# Patient Record
Sex: FEMALE | Race: WHITE | URBAN - METROPOLITAN AREA
[De-identification: names, ages, dates, MRNs, and addresses within clinical notes are randomized per-mention and may not be internally consistent; named-entity substitution may affect disease eponyms.]

---

## 2018-03-30 ENCOUNTER — INPATIENT (INPATIENT)
Facility: HOSPITAL | Age: 48
LOS: 1 days | Discharge: HOME | DRG: 206 | End: 2018-04-01
Attending: INTERNAL MEDICINE | Admitting: INTERNAL MEDICINE
Payer: MEDICAID

## 2018-03-30 VITALS
DIASTOLIC BLOOD PRESSURE: 73 MMHG | RESPIRATION RATE: 16 BRPM | TEMPERATURE: 99 F | SYSTOLIC BLOOD PRESSURE: 110 MMHG | OXYGEN SATURATION: 96 % | HEART RATE: 74 BPM

## 2018-03-30 DIAGNOSIS — R07.9 CHEST PAIN, UNSPECIFIED: ICD-10-CM

## 2018-03-30 PROBLEM — Z00.00 ENCOUNTER FOR PREVENTIVE HEALTH EXAMINATION: Status: ACTIVE | Noted: 2018-03-30

## 2018-03-30 LAB
ALBUMIN SERPL ELPH-MCNC: 3.2 G/DL — LOW (ref 3.5–5)
ALP SERPL-CCNC: 74 U/L — SIGNIFICANT CHANGE UP (ref 40–120)
ALT FLD-CCNC: 16 U/L DA — SIGNIFICANT CHANGE UP (ref 10–60)
ANION GAP SERPL CALC-SCNC: 9 MMOL/L — SIGNIFICANT CHANGE UP (ref 5–17)
AST SERPL-CCNC: 14 U/L — SIGNIFICANT CHANGE UP (ref 10–40)
BASOPHILS # BLD AUTO: 0.1 K/UL — SIGNIFICANT CHANGE UP (ref 0–0.2)
BASOPHILS NFR BLD AUTO: 0.7 % — SIGNIFICANT CHANGE UP (ref 0–2)
BILIRUB SERPL-MCNC: 0.5 MG/DL — SIGNIFICANT CHANGE UP (ref 0.2–1.2)
BUN SERPL-MCNC: 7 MG/DL — SIGNIFICANT CHANGE UP (ref 7–18)
CALCIUM SERPL-MCNC: 8.9 MG/DL — SIGNIFICANT CHANGE UP (ref 8.4–10.5)
CHLORIDE SERPL-SCNC: 107 MMOL/L — SIGNIFICANT CHANGE UP (ref 96–108)
CO2 SERPL-SCNC: 25 MMOL/L — SIGNIFICANT CHANGE UP (ref 22–31)
CREAT SERPL-MCNC: 0.57 MG/DL — SIGNIFICANT CHANGE UP (ref 0.5–1.3)
EOSINOPHIL # BLD AUTO: 0.2 K/UL — SIGNIFICANT CHANGE UP (ref 0–0.5)
EOSINOPHIL NFR BLD AUTO: 3.1 % — SIGNIFICANT CHANGE UP (ref 0–6)
GLUCOSE SERPL-MCNC: 93 MG/DL — SIGNIFICANT CHANGE UP (ref 70–99)
HCT VFR BLD CALC: 40.8 % — SIGNIFICANT CHANGE UP (ref 34.5–45)
HGB BLD-MCNC: 12.6 G/DL — SIGNIFICANT CHANGE UP (ref 11.5–15.5)
LYMPHOCYTES # BLD AUTO: 2.5 K/UL — SIGNIFICANT CHANGE UP (ref 1–3.3)
LYMPHOCYTES # BLD AUTO: 30.4 % — SIGNIFICANT CHANGE UP (ref 13–44)
MCHC RBC-ENTMCNC: 28 PG — SIGNIFICANT CHANGE UP (ref 27–34)
MCHC RBC-ENTMCNC: 31 GM/DL — LOW (ref 32–36)
MCV RBC AUTO: 90.4 FL — SIGNIFICANT CHANGE UP (ref 80–100)
MONOCYTES # BLD AUTO: 0.4 K/UL — SIGNIFICANT CHANGE UP (ref 0–0.9)
MONOCYTES NFR BLD AUTO: 4.5 % — SIGNIFICANT CHANGE UP (ref 2–14)
NEUTROPHILS # BLD AUTO: 5 K/UL — SIGNIFICANT CHANGE UP (ref 1.8–7.4)
NEUTROPHILS NFR BLD AUTO: 61.4 % — SIGNIFICANT CHANGE UP (ref 43–77)
PLATELET # BLD AUTO: 254 K/UL — SIGNIFICANT CHANGE UP (ref 150–400)
POTASSIUM SERPL-MCNC: 4.2 MMOL/L — SIGNIFICANT CHANGE UP (ref 3.5–5.3)
POTASSIUM SERPL-SCNC: 4.2 MMOL/L — SIGNIFICANT CHANGE UP (ref 3.5–5.3)
PROT SERPL-MCNC: 6.9 G/DL — SIGNIFICANT CHANGE UP (ref 6–8.3)
RBC # BLD: 4.52 M/UL — SIGNIFICANT CHANGE UP (ref 3.8–5.2)
RBC # FLD: 16.1 % — HIGH (ref 10.3–14.5)
SODIUM SERPL-SCNC: 141 MMOL/L — SIGNIFICANT CHANGE UP (ref 135–145)
TROPONIN I SERPL-MCNC: <0.015 NG/ML — SIGNIFICANT CHANGE UP (ref 0–0.04)
TROPONIN I SERPL-MCNC: <0.015 NG/ML — SIGNIFICANT CHANGE UP (ref 0–0.04)
WBC # BLD: 8.2 K/UL — SIGNIFICANT CHANGE UP (ref 3.8–10.5)
WBC # FLD AUTO: 8.2 K/UL — SIGNIFICANT CHANGE UP (ref 3.8–10.5)

## 2018-03-30 PROCEDURE — 70450 CT HEAD/BRAIN W/O DYE: CPT | Mod: 26

## 2018-03-30 PROCEDURE — 99285 EMERGENCY DEPT VISIT HI MDM: CPT

## 2018-03-30 PROCEDURE — 71045 X-RAY EXAM CHEST 1 VIEW: CPT | Mod: 26

## 2018-03-30 RX ORDER — ATORVASTATIN CALCIUM 80 MG/1
40 TABLET, FILM COATED ORAL AT BEDTIME
Qty: 0 | Refills: 0 | Status: DISCONTINUED | OUTPATIENT
Start: 2018-03-30 | End: 2018-04-01

## 2018-03-30 RX ORDER — ACETAMINOPHEN 500 MG
650 TABLET ORAL EVERY 6 HOURS
Qty: 0 | Refills: 0 | Status: DISCONTINUED | OUTPATIENT
Start: 2018-03-30 | End: 2018-04-01

## 2018-03-30 RX ORDER — ASPIRIN/CALCIUM CARB/MAGNESIUM 324 MG
81 TABLET ORAL DAILY
Qty: 0 | Refills: 0 | Status: DISCONTINUED | OUTPATIENT
Start: 2018-03-31 | End: 2018-04-01

## 2018-03-30 RX ORDER — ASPIRIN/CALCIUM CARB/MAGNESIUM 324 MG
325 TABLET ORAL ONCE
Qty: 0 | Refills: 0 | Status: COMPLETED | OUTPATIENT
Start: 2018-03-30 | End: 2018-03-30

## 2018-03-30 RX ORDER — NICOTINE POLACRILEX 2 MG
1 GUM BUCCAL DAILY
Qty: 0 | Refills: 0 | Status: DISCONTINUED | OUTPATIENT
Start: 2018-03-30 | End: 2018-03-31

## 2018-03-30 RX ADMIN — Medication 325 MILLIGRAM(S): at 22:08

## 2018-03-30 NOTE — H&P ADULT - NSHPLABSRESULTS_GEN_ALL_CORE
LABS:                        12.6   8.2   )-----------( 254      ( 30 Mar 2018 15:21 )             40.8     03-30    141  |  107  |  7   ----------------------------<  93  4.2   |  25  |  0.57    Ca    8.9      30 Mar 2018 15:16    TPro  6.9  /  Alb  3.2<L>  /  TBili  0.5  /  DBili  x   /  AST  14  /  ALT  16  /  AlkPhos  74  03-30        CAPILLARY BLOOD GLUCOSE        LIVER FUNCTIONS - ( 30 Mar 2018 15:16 )  Alb: 3.2 g/dL / Pro: 6.9 g/dL / ALK PHOS: 74 U/L / ALT: 16 U/L DA / AST: 14 U/L / GGT: x             CARDIAC MARKERS ( 30 Mar 2018 19:45 )  <0.015 ng/mL / x     / x     / x     / x      CARDIAC MARKERS ( 30 Mar 2018 15:16 )  <0.015 ng/mL / x     / x     / x     / x    < from: CT Head No Cont (03.30.18 @ 23:24) >    FINDINGS:    There is no acute intracranial hemorrhage, mass effect from vasogenic   edema or evidence of acute territorial infarct.    The ventricles, sulci and cisternal spaces are unremarkable in size and   configuration.  There is no midline shift or abnormal extra-axial fluid   collection.    The calvarium is intact.  There are no osteoblastic or lytic calvarial or   skull base lesions.  The paranasal sinuses and mastoid air cells are   clear.    IMPRESSION:  No acute intracranial hemorrhage, mass effect or evidence of acute   territorial infarct.    < end of copied text >    < from: Xray Chest 1 View AP/PA (03.30.18 @ 14:36) >      Heart size is within normal limits.    The present study shows minimal linear densities at the left lower lung   field laterally suggesting slight atelectasis or scar. These are new   since October 28, 2011.    IMPRESSION: Minimal linear left lower lung findings are now seen.    < end of copied text >

## 2018-03-30 NOTE — H&P ADULT - NSHPPHYSICALEXAM_GEN_ALL_CORE
PHYSICAL EXAM:  GENERAL: NAD, well-groomed, well-developed  HEAD:  Atraumatic, Normocephalic  EYES: EOMI, PERRLA, conjunctiva and sclera clear  ENMT: No tonsillar erythema, exudates, or enlargement; Moist mucous membranes, Good dentition, No lesions  NECK: Supple, No JVD, Normal thyroid  NERVOUS SYSTEM:  Alert & Oriented X3, Good concentration; Motor Strength 5/5 B/L upper and lower extremities; mild left hand grab strength compared to right side  CHEST/LUNG: Clear to percussion bilaterally; No rales, rhonchi, wheezing, or rubs  HEART: Regular rate and rhythm; No murmurs, rubs, or gallops  ABDOMEN: Soft, Nontender, Nondistended; Bowel sounds present  EXTREMITIES:  2+ Peripheral Pulses bilaterally, No clubbing, cyanosis, or edema  LYMPH: No lymphadenopathy noted  SKIN: No rashes or lesions    T(C): 36.7 (03-30-18 @ 20:49), Max: 37.3 (03-30-18 @ 13:58)  HR: 50 (03-30-18 @ 20:49) (50 - 74)  BP: 99/52 (03-30-18 @ 20:49) (99/52 - 110/73)  RR: 18 (03-30-18 @ 20:49) (16 - 18)  SpO2: 98% (03-30-18 @ 20:49) (96% - 99%)  Wt(kg): --  I&O's Summary

## 2018-03-30 NOTE — H&P ADULT - PROBLEM SELECTOR PLAN 2
-r/o CVA, given risk factors(smoking, HLD). Will check TTE and MR head  -CT head negative  -Aspirin and statin started

## 2018-03-30 NOTE — ED PROVIDER NOTE - OBJECTIVE STATEMENT
46 y/o F pt w/ no significant PMHx, however is current smoker presents to the ED c/o substernal chest discomfort, dyspnea on exertion today. 4 days ago, pt had transient L arm numbness, no weakness. Associates dizziness secondary to symptoms. Denies fever, chills, diaphoresis or any other complaints. NKDA.

## 2018-03-30 NOTE — H&P ADULT - HISTORY OF PRESENT ILLNESS
47 years old female from home, lives alone, walks independently, with PMH of pre-diabetes, premenopause, known bradycardia and borderline to low BP, HLD, and active daily smoker presented to ED c/o substernal chest pain started yesterday. Pain is 6/10, radiating to her left chest, pressure-like, and worsens with exertion and taking deep breath. She also reports 2 times of transient left arm and hand numbness which occurred 4 days ago and yesterday, lasted about 15 minutes, associated with brief dizziness, and mild weakness of the hand. She still feels weakness on her left arm and hand now. She denies having fever, chills, SOB, diarrhea, abdominal pain, palpitation, or any other symptoms. She underwent stress test and full cardiac work-up within last 6 month, as her PMD was concerned about her bradycardia and borderline BP. However, all work-ups came out to be negative. She does not remember the name of the cardiologist.      In ED, BP 99/52, HR 50, other VS WNL, EKG NSR at 72 BPM with TWI in V1-V3, V4 T wave flattening, normal cardiac enz x2, labs grossly not significant, CT head negative. CXR; minimal left lung slight atelectasis. 47 years old female from home, lives alone, walks independently, with PMH of pre-diabetes, premenopause, known bradycardia and borderline to low BP, HLD, and active daily smoker presented to ED c/o substernal chest pain started yesterday. Pain is 6/10, radiating to her left chest, pressure-like, and worsens with exertion and taking deep breath. She also reports 2 times of transient left arm and hand numbness which occurred 4 days ago and yesterday, lasted about 15 minutes, associated with brief dizziness, and mild weakness of the hand. She still feels weakness on her left arm and hand now. She denies having fever, chills, SOB, diarrhea, abdominal pain, palpitation, or any other symptoms. She underwent stress test and full cardiac work-up within last 6 month, as her PMD was concerned about her bradycardia and borderline BP. However, all work-ups came out to be negative. She does not remember the name of the cardiologist.      In ED, BP 99/52, HR 50, other VS WNL, EKG NSR at 72 BPM with TWI in V1-V3, V4 T wave flattening, normal cardiac enz x2, labs grossly not significant, CT head negative. CXR; minimal left lung slight atelectasis. Pt still c/o 47 years old female from home, lives alone, walks independently, with PMH of pre-diabetes, premenopause, known bradycardia and borderline to low BP, HLD, and active daily smoker presented to ED c/o substernal chest pain started yesterday. Pain is 6/10, radiating to her left chest, pressure-like, and worsens with exertion and taking deep breath. She also reports 2 times of transient left arm and hand numbness which occurred 4 days ago and yesterday, lasted about 15 minutes, associated with brief dizziness, and mild weakness of the hand. She still feels weakness on her left arm and hand now. She denies having fever, chills, SOB, diarrhea, abdominal pain, palpitation, or any other symptoms. She underwent stress test and full cardiac work-up within last 6 month, as her PMD was concerned about her bradycardia and borderline BP. However, all work-ups came out to be negative. She does not remember the name of the cardiologist.      In ED, BP 99/52, HR 50, other VS WNL, EKG NSR at 72 BPM with TWI in V1-V3, V4 T wave flattening, normal cardiac enz x2, labs grossly not significant, CT head negative. CXR; minimal left lung slight atelectasis. Pt still c/o midchest pain and mild weakness of the left hand. Is admitted to the telemetry floor for 1. r/o ACS  2. r/o ischemic stroke.

## 2018-03-30 NOTE — H&P ADULT - ASSESSMENT
Patient is a 47y old  Female who presents with a chief complaint of Chest pain, and left arm numbness (30 Mar 2018 23:44). Is admitted to the telemetry floor for 1. r/o ACS  2. r/o ischemic stroke.

## 2018-03-30 NOTE — H&P ADULT - PROBLEM SELECTOR PLAN 1
-r/o ACS given risk factors(smoking, HLD).  -aspirin and statin started, not starting metoprolol as pt is bradycardic  -PERC score 0, low suspicion for PE. Pt is hemodynamically stable  -check TTE, would need to obtain record from PMD about stress test and previous echo result  -Telemetry monitoring and trend cardiac enzymes  -Cardio Dr. Douglass

## 2018-03-30 NOTE — H&P ADULT - PROBLEM SELECTOR PLAN 4
-IMPROVE VTE Individual Risk Assessment          RISK                                                          Points  [  ] Previous VTE                                                3  [  ] Thrombophilia                                             2  [  ] Lower limb paralysis                                   2        (unable to hold up >15 seconds)    [  ] Current Cancer                                            2         (within 6 months)  [  ] Immobilization > 24 hrs                             1  [  ] ICU/CCU stay > 24 hours                           1  [  ] Age > 60                                                       1  IMPROVE VTE Score ____0_____  -No need for chemical DVT PPx

## 2018-03-30 NOTE — ED PROVIDER NOTE - PROGRESS NOTE DETAILS
Pt with persistent chest discomfort and KNIGHT.  Troponin negative and EKG with no acute ischemic abnormality, however will admit for further workup and specialist evaluation given persistent symptoms.    -Informed Dr. Jewell for admission.  Paged MAR.

## 2018-03-31 ENCOUNTER — TRANSCRIPTION ENCOUNTER (OUTPATIENT)
Age: 48
End: 2018-03-31

## 2018-03-31 DIAGNOSIS — S82.891A OTHER FRACTURE OF RIGHT LOWER LEG, INITIAL ENCOUNTER FOR CLOSED FRACTURE: Chronic | ICD-10-CM

## 2018-03-31 DIAGNOSIS — E78.5 HYPERLIPIDEMIA, UNSPECIFIED: ICD-10-CM

## 2018-03-31 DIAGNOSIS — R07.9 CHEST PAIN, UNSPECIFIED: ICD-10-CM

## 2018-03-31 DIAGNOSIS — Z29.9 ENCOUNTER FOR PROPHYLACTIC MEASURES, UNSPECIFIED: ICD-10-CM

## 2018-03-31 DIAGNOSIS — R20.0 ANESTHESIA OF SKIN: ICD-10-CM

## 2018-03-31 DIAGNOSIS — F17.200 NICOTINE DEPENDENCE, UNSPECIFIED, UNCOMPLICATED: ICD-10-CM

## 2018-03-31 DIAGNOSIS — M94.0 CHONDROCOSTAL JUNCTION SYNDROME [TIETZE]: ICD-10-CM

## 2018-03-31 DIAGNOSIS — N85.8 OTHER SPECIFIED NONINFLAMMATORY DISORDERS OF UTERUS: Chronic | ICD-10-CM

## 2018-03-31 LAB
ANION GAP SERPL CALC-SCNC: 8 MMOL/L — SIGNIFICANT CHANGE UP (ref 5–17)
APTT BLD: 32 SEC — SIGNIFICANT CHANGE UP (ref 27.5–37.4)
BASOPHILS # BLD AUTO: 0.1 K/UL — SIGNIFICANT CHANGE UP (ref 0–0.2)
BASOPHILS NFR BLD AUTO: 0.7 % — SIGNIFICANT CHANGE UP (ref 0–2)
BUN SERPL-MCNC: 6 MG/DL — LOW (ref 7–18)
CALCIUM SERPL-MCNC: 8.5 MG/DL — SIGNIFICANT CHANGE UP (ref 8.4–10.5)
CHLORIDE SERPL-SCNC: 109 MMOL/L — HIGH (ref 96–108)
CHOLEST SERPL-MCNC: 231 MG/DL — HIGH (ref 10–199)
CK MB BLD-MCNC: <1.5 % — SIGNIFICANT CHANGE UP (ref 0–3.5)
CK MB CFR SERPL CALC: <1 NG/ML — SIGNIFICANT CHANGE UP (ref 0–3.6)
CK SERPL-CCNC: 66 U/L — SIGNIFICANT CHANGE UP (ref 21–215)
CO2 SERPL-SCNC: 23 MMOL/L — SIGNIFICANT CHANGE UP (ref 22–31)
CREAT SERPL-MCNC: 0.5 MG/DL — SIGNIFICANT CHANGE UP (ref 0.5–1.3)
EOSINOPHIL # BLD AUTO: 0.3 K/UL — SIGNIFICANT CHANGE UP (ref 0–0.5)
EOSINOPHIL NFR BLD AUTO: 3.3 % — SIGNIFICANT CHANGE UP (ref 0–6)
ESTIMATED AVERAGE GLUCOSE: 117 MG/DL — HIGH (ref 68–114)
FOLATE SERPL-MCNC: 10.6 NG/ML — SIGNIFICANT CHANGE UP (ref 4.8–24.2)
GLUCOSE BLDC GLUCOMTR-MCNC: 94 MG/DL — SIGNIFICANT CHANGE UP (ref 70–99)
GLUCOSE SERPL-MCNC: 88 MG/DL — SIGNIFICANT CHANGE UP (ref 70–99)
HBA1C BLD-MCNC: 5.7 % — HIGH (ref 4–5.6)
HCG UR QL: NEGATIVE — SIGNIFICANT CHANGE UP
HCT VFR BLD CALC: 40.1 % — SIGNIFICANT CHANGE UP (ref 34.5–45)
HDLC SERPL-MCNC: 32 MG/DL — LOW (ref 40–125)
HGB BLD-MCNC: 12.4 G/DL — SIGNIFICANT CHANGE UP (ref 11.5–15.5)
HIV 1+2 AB+HIV1 P24 AG SERPL QL IA: SIGNIFICANT CHANGE UP
INR BLD: 1.09 RATIO — SIGNIFICANT CHANGE UP (ref 0.88–1.16)
LIPID PNL WITH DIRECT LDL SERPL: 138 MG/DL — SIGNIFICANT CHANGE UP
LYMPHOCYTES # BLD AUTO: 2.4 K/UL — SIGNIFICANT CHANGE UP (ref 1–3.3)
LYMPHOCYTES # BLD AUTO: 31.8 % — SIGNIFICANT CHANGE UP (ref 13–44)
MAGNESIUM SERPL-MCNC: 2.1 MG/DL — SIGNIFICANT CHANGE UP (ref 1.6–2.6)
MCHC RBC-ENTMCNC: 27.8 PG — SIGNIFICANT CHANGE UP (ref 27–34)
MCHC RBC-ENTMCNC: 31 GM/DL — LOW (ref 32–36)
MCV RBC AUTO: 89.7 FL — SIGNIFICANT CHANGE UP (ref 80–100)
MONOCYTES # BLD AUTO: 0.5 K/UL — SIGNIFICANT CHANGE UP (ref 0–0.9)
MONOCYTES NFR BLD AUTO: 6.4 % — SIGNIFICANT CHANGE UP (ref 2–14)
NEUTROPHILS # BLD AUTO: 4.4 K/UL — SIGNIFICANT CHANGE UP (ref 1.8–7.4)
NEUTROPHILS NFR BLD AUTO: 57.7 % — SIGNIFICANT CHANGE UP (ref 43–77)
PHOSPHATE SERPL-MCNC: 3.4 MG/DL — SIGNIFICANT CHANGE UP (ref 2.5–4.5)
PLATELET # BLD AUTO: 239 K/UL — SIGNIFICANT CHANGE UP (ref 150–400)
POTASSIUM SERPL-MCNC: 3.9 MMOL/L — SIGNIFICANT CHANGE UP (ref 3.5–5.3)
POTASSIUM SERPL-SCNC: 3.9 MMOL/L — SIGNIFICANT CHANGE UP (ref 3.5–5.3)
PROTHROM AB SERPL-ACNC: 11.9 SEC — SIGNIFICANT CHANGE UP (ref 9.8–12.7)
RBC # BLD: 4.47 M/UL — SIGNIFICANT CHANGE UP (ref 3.8–5.2)
RBC # FLD: 16 % — HIGH (ref 10.3–14.5)
SODIUM SERPL-SCNC: 140 MMOL/L — SIGNIFICANT CHANGE UP (ref 135–145)
TOTAL CHOLESTEROL/HDL RATIO MEASUREMENT: 7.2 RATIO — HIGH (ref 3.3–7.1)
TRIGL SERPL-MCNC: 306 MG/DL — HIGH (ref 10–149)
TROPONIN I SERPL-MCNC: <0.015 NG/ML — SIGNIFICANT CHANGE UP (ref 0–0.04)
TSH SERPL-MCNC: 2.41 UU/ML — SIGNIFICANT CHANGE UP (ref 0.34–4.82)
VIT B12 SERPL-MCNC: 211 PG/ML — LOW (ref 232–1245)
WBC # BLD: 7.6 K/UL — SIGNIFICANT CHANGE UP (ref 3.8–10.5)
WBC # FLD AUTO: 7.6 K/UL — SIGNIFICANT CHANGE UP (ref 3.8–10.5)

## 2018-03-31 PROCEDURE — 70496 CT ANGIOGRAPHY HEAD: CPT | Mod: 26

## 2018-03-31 PROCEDURE — 70498 CT ANGIOGRAPHY NECK: CPT | Mod: 26

## 2018-03-31 RX ORDER — NICOTINE POLACRILEX 2 MG
1 GUM BUCCAL DAILY
Qty: 0 | Refills: 0 | Status: DISCONTINUED | OUTPATIENT
Start: 2018-03-31 | End: 2018-04-01

## 2018-03-31 RX ORDER — NICOTINE POLACRILEX 2 MG
1 GUM BUCCAL
Qty: 0 | Refills: 0 | COMMUNITY
Start: 2018-03-31

## 2018-03-31 RX ORDER — ACETAMINOPHEN 500 MG
2 TABLET ORAL
Qty: 0 | Refills: 0 | COMMUNITY
Start: 2018-03-31

## 2018-03-31 RX ORDER — NICOTINE POLACRILEX 2 MG
1 GUM BUCCAL DAILY
Qty: 0 | Refills: 0 | Status: DISCONTINUED | OUTPATIENT
Start: 2018-03-31 | End: 2018-03-31

## 2018-03-31 RX ORDER — INFLUENZA VIRUS VACCINE 15; 15; 15; 15 UG/.5ML; UG/.5ML; UG/.5ML; UG/.5ML
0.5 SUSPENSION INTRAMUSCULAR ONCE
Qty: 0 | Refills: 0 | Status: COMPLETED | OUTPATIENT
Start: 2018-03-31 | End: 2018-03-31

## 2018-03-31 RX ORDER — FAMOTIDINE 10 MG/ML
20 INJECTION INTRAVENOUS
Qty: 0 | Refills: 0 | Status: DISCONTINUED | OUTPATIENT
Start: 2018-03-31 | End: 2018-04-01

## 2018-03-31 RX ORDER — FAMOTIDINE 10 MG/ML
1 INJECTION INTRAVENOUS
Qty: 0 | Refills: 0 | COMMUNITY
Start: 2018-03-31

## 2018-03-31 RX ADMIN — Medication 1 PATCH: at 01:31

## 2018-03-31 RX ADMIN — Medication 500 MILLIGRAM(S): at 15:33

## 2018-03-31 RX ADMIN — Medication 500 MILLIGRAM(S): at 16:03

## 2018-03-31 RX ADMIN — Medication 81 MILLIGRAM(S): at 12:31

## 2018-03-31 RX ADMIN — FAMOTIDINE 20 MILLIGRAM(S): 10 INJECTION INTRAVENOUS at 18:31

## 2018-03-31 RX ADMIN — Medication 1 PATCH: at 14:44

## 2018-03-31 NOTE — CONSULT NOTE ADULT - ATTENDING COMMENTS
Thank you for the courtesy of the consultation,I would be available for any further discussion if needed.  Gianluca Douglass MD,FACC.  [address] [place], [place] [id number] [phone]

## 2018-03-31 NOTE — DISCHARGE NOTE ADULT - MEDICATION SUMMARY - MEDICATIONS TO TAKE
I will START or STAY ON the medications listed below when I get home from the hospital:    acetaminophen 325 mg oral tablet  -- 2 tab(s) by mouth every 6 hours, As needed, Moderate Pain (4 - 6)  -- Indication: For Chest pain, unspecified type    naproxen 500 mg oral tablet  -- 1 tab(s) by mouth 2 times a day  -- Indication: For Chest pain, unspecified type    famotidine 20 mg oral tablet  -- 1 tab(s) by mouth 2 times a day  -- Indication: For Gastritis    nicotine 14 mg/24 hr transdermal film, extended release  -- 1 film(s) transdermal  -- Indication: For Nicotine Dependence

## 2018-03-31 NOTE — DISCHARGE NOTE ADULT - PATIENT PORTAL LINK FT
You can access the [facility] Patient Portal, offered by [facility], by registering with the following website: [email or web address]

## 2018-03-31 NOTE — DISCHARGE NOTE ADULT - CARE PLAN
Principal Discharge DX:	Costochondritis  Goal:	continue with ibuprofen as above.  Assessment and plan of treatment:	Continue with ibuprofen as above for likely inflammation causing your chest pain. Please take famotidine with this medication as it can cause upper GI irritation and even bleeding. Please follow-up with your primary care doctor in 1-2 weeks.  Secondary Diagnosis:	HLD (hyperlipidemia)  Assessment and plan of treatment:	Your lipid panel was found to be elevated. You will likely need to repeat a lipid panel with 12 hours of fasting with your primary care doctor in 1-2 weeks.  Secondary Diagnosis:	Left arm numbness  Assessment and plan of treatment:	You were worked up for possible stroke. CTA of head and neck was performed, which did not show any abnormalities. Please follow-up with your primary care doctor as routine.  Secondary Diagnosis:	Smoker  Assessment and plan of treatment:	Prescription has been provided for smoking cessation. Please follow-up with your primary care doctor. Principal Discharge DX:	Costochondritis  Goal:	continue with ibuprofen as above.  Assessment and plan of treatment:	Continue with Naproxen for 7 days as above for likely inflammation causing your chest pain. Please take famotidine with this medication as it can cause upper GI irritation and even bleeding. Please follow-up with your primary care doctor in 1-2 weeks.  Secondary Diagnosis:	HLD (hyperlipidemia)  Assessment and plan of treatment:	Your lipid panel was found to be elevated. You will likely need to repeat a lipid panel with 12 hours of fasting with your primary care doctor in 1-2 weeks.  Secondary Diagnosis:	Left arm numbness  Assessment and plan of treatment:	You were worked up for possible stroke. CTA of head and neck was performed, which did not show any abnormalities. Please follow-up with your primary care doctor as routine.  Secondary Diagnosis:	Smoker  Assessment and plan of treatment:	Prescription has been provided for smoking cessation. Please follow-up with your primary care doctor.

## 2018-03-31 NOTE — CONSULT NOTE ADULT - ASSESSMENT
Patient is a 47y old  Female who presents with a chief complaint of Chest pain, and left arm numbnessadmitted to the telemetry floor for 1. r/o ACS  2. r/o ischemic stroke.        Problem/Plan - 1:  ·  Problem: Chest pain, unspecified type.  Plan: -r/o ACS given risk factors(smoking, HLD).  No evidence for recent cardiac injury-recent ischemic work-up negative.  Repeat TTE-if LV function preserved,no further cardiac work-up.      Problem/Plan - 2:  ·  Problem: Left arm numbness.  Plan: -r/o CVA, given risk factors(smoking, HLD). Will check MRI head      Problem/Plan - 3:  ·  Problem: Smoker.  Plan: -Smoking cessation education was given and patient agreed with nicotine patch.

## 2018-03-31 NOTE — DISCHARGE NOTE ADULT - HOSPITAL COURSE
47 years old female from home, lives alone, walks independently, with PMH of pre-diabetes, premenopause, known bradycardia and borderline to low BP, HLD, and active daily smoker presented to ED c/o substernal chest pain started yesterday. Pain is 6/10, radiating to her left chest, pressure-like, and worsens with exertion and taking deep breath. She also reports 2 times of transient left arm and hand numbness which occurred 4 days ago and yesterday, lasted about 15 minutes, associated with brief dizziness, and mild weakness of the hand. She still feels weakness on her left arm and hand now. She denies having fever, chills, SOB, diarrhea, abdominal pain, palpitation, or any other symptoms. She underwent stress test and full cardiac work-up within last 6 month, as her PMD was concerned about her bradycardia and borderline BP. However, all work-ups came out to be negative. She does not remember the name of the cardiologist.  In ED, BP 99/52, HR 50, other VS WNL, EKG NSR at 72 BPM with TWI in V1-V3, V4 T wave flattening, normal cardiac enz x2, labs grossly not significant, CT head negative. CXR; minimal left lung slight atelectasis. Pt still c/o midchest pain and mild weakness of the left hand. Is admitted to the telemetry floor for 1. r/o ACS  2. r/o ischemic stroke. Cardiac enzymes were negative x3. Echocardiogram was performed. Cardiology was consulted and cleared patient from cardiology perspective. Neurology Dr. Tony saw the patient and recommended a CTA of head and neck (as patient has metal plate in her ankle and cannot undergo MRI). CTA was performed which did not show any signs of stroke.    Pt is stable for discharge home. Patient to follow-up with primary care doctor as routine.

## 2018-03-31 NOTE — DISCHARGE NOTE ADULT - NS AS DC FOLLOWUP STROKE INST
Influenza vaccination (VIS Pub Date: August 7, 2015) Influenza vaccination (VIS Pub Date: August 7, 2015)/Smoking Cessation

## 2018-03-31 NOTE — CONSULT NOTE ADULT - SUBJECTIVE AND OBJECTIVE BOX
CHIEF COMPLAINT:Chest pain    HPI:--47 years old female from home, lives alone, walks independently, with PMH of pre-diabetes, premenopause, known bradycardia and borderline to low BP, HLD, and active daily smoker presented to ED c/o substernal chest pain started yesterday. Pain is 6/10, radiating to her left chest, pressure-like, and worsens with exertion and taking deep breath. She also reports 2 times of transient left arm and hand numbness which occurred 4 days ago and yesterday, lasted about 15 minutes, associated with brief dizziness, and mild weakness of the hand. She still feels weakness on her left arm and hand now. She denies having fever, chills, SOB, diarrhea, abdominal pain, palpitation, or any other symptoms. She underwent stress test and full cardiac work-up within last 6 month, as her PMD was concerned about her bradycardia and borderline BP. However, all work-ups came out to be negative.     In ED, BP 99/52, HR 50, other VS WNL, EKG NSR at 72 BPM with TWI in V1-V3, V4 T wave flattening, normal cardiac enz x2, labs grossly not significant, CT head negative. CXR; minimal left lung slight atelectasis. Pt still c/o midchest pain and mild weakness of the left hand. Is admitted to the telemetry floor for 1. r/o ACS  2. r/o ischemic stroke. No overnight events noted-hand numbness has improved.    PAST MEDICAL & SURGICAL HISTORY:  HLD (hyperlipidemia)  Smoker  Pre-diabetes  Uterine cyst  Closed fracture of right ankle, initial encounter      MEDICATIONS  (STANDING):  aspirin  chewable 81 milliGRAM(s) Oral daily  atorvastatin 40 milliGRAM(s) Oral at bedtime  influenza   Vaccine 0.5 milliLiter(s) IntraMuscular once  nicotine -  14 mG/24Hr(s) Patch 1 patch Transdermal daily    MEDICATIONS  (PRN):  acetaminophen   Tablet. 650 milliGRAM(s) Oral every 6 hours PRN Moderate Pain (4 - 6)      FAMILY HISTORY:  Family history of emphysema (Father)  No family history of premature coronary artery disease or sudden cardiac death    SOCIAL HISTORY:  Smoking-Current smoker  Alcohol-Denied  Ilicit Drug use-Denied    REVIEW OF SYSTEMS:  Constitutional: [ ] fever, [ ]weight loss, [ ]fatigue Activity [ ] Bedbound,[ ] Ambulates [ ] Unassisted[ ] Cane/Walker [ ] Assistence.  Eyes: [ ] visual changes  Respiratory: [ ]shortness of breath;  [ ] cough, [ ]wheezing, [ ]chills, [ ]hemoptysis  Cardiovascular: [x ] chest pain, [ ]palpitations, [ ]dizziness,  [ ]leg swelling[ ]orthopnea [ ]PND  Gastrointestinal: [ ] abdominal pain, [ ]nausea, [ ]vomiting,  [ ]diarrhea,[ ]constipation  Genitourinary: [ ] dysuria, [ ] hematuria  Neurologic: [ ] headaches [ ] tremors[x ] weakness  Skin: [ ] itching, [ ]burning, [ ] rashes  Endocrine: [ ] heat or cold intolerance  Musculoskeletal: [ ] joint pain or swelling; [ ] muscle, back, or extremity pain  Psychiatric: [ ] depression, [ ]anxiety, [ ]mood swings, or [ ]difficulty sleeping  Hematologic: [ ] easy bruising, [ ] bleeding gums       [ x] All others negative	  [ ] Unable to obtain    Vital Signs Last 24 Hrs  T(C): 36.9 (31 Mar 2018 05:48), Max: 37.3 (30 Mar 2018 13:58)  T(F): 98.5 (31 Mar 2018 05:48), Max: 99.2 (30 Mar 2018 13:58)  HR: 55 (31 Mar 2018 05:48) (50 - 74)  BP: 97/56 (31 Mar 2018 05:48) (97/56 - 127/61)  RR: 17 (31 Mar 2018 05:48) (16 - 18)  SpO2: 100% (31 Mar 2018 05:48) (96% - 100%)      PHYSICAL EXAM:  General: No acute distress BMI-32  HEENT: EOMI, PERRL[ ] Icteric  Neck: Supple, No JVD  Lungs: Equal air entry bilaterally; [ ] Rales [ ] Rhonchi [ ] Wheezing  Heart: Regular rate and rhythm;[x ] Murmurs-  2 /6 [x ] Systolic [ ] Diastolic [ ] Radiation,No rubs, or gallops  Abdomen: Nontender, bowel sounds present  Extremities: No clubbing, cyanosis, or edema[ ] Calf tenderness  Nervous system:  Alert & Oriented X3, no focal deficits  Psychiatric: Normal affect  Skin: No rashes or lesions      LABS:  03-31    140  |  109<H>  |  6<L>  ----------------------------<  88  3.9   |  23  |  0.50    Ca    8.5      31 Mar 2018 07:25  Phos  3.4     03-31  Mg     2.1     03-31    TPro  6.9  /  Alb  3.2<L>  /  TBili  0.5  /  DBili  x   /  AST  14  /  ALT  16  /  AlkPhos  74  03-30    Creatinine Trend: 0.50<--, 0.57<--                        12.4   7.6   )-----------( 239      ( 31 Mar 2018 07:25 )             40.1     PT/INR - ( 31 Mar 2018 07:25 )   PT: 11.9 sec;   INR: 1.09 ratio         PTT - ( 31 Mar 2018 07:25 )  PTT:32.0 sec    Lipid Panel: Cholesterol, Serum 231  Direct   HDL Cholesterol, Serum 32  Triglycerides, Serum 306    Cardiac Enzymes: CARDIAC MARKERS ( 31 Mar 2018 07:25 )  <0.015 ng/mL / x     / 66 U/L / x     / <1.0 ng/mL  CARDIAC MARKERS ( 30 Mar 2018 19:45 )  <0.015 ng/mL / x     / x     / x     / x      CARDIAC MARKERS ( 30 Mar 2018 15:16 )  <0.015 ng/mL / x     / x     / x     / x            RADIOLOGY: CT BRAIN      IMPRESSION:-No acute intracranial hemorrhage, mass effect or evidence of acute  territorial infarct.    ECG [my interpretation]:Sinus rhythm at 72bpm Non specific T wave abnormalities.    TELEMETRY:Sinus rhythm no arrhythmias    ECHO:PENDING

## 2018-03-31 NOTE — CONSULT NOTE ADULT - SUBJECTIVE AND OBJECTIVE BOX
Neurology consult    KELI [id number]    HPI:  47 years old female from home, lives alone, walks independently, with PMH of pre-diabetes, premenopause, known bradycardia and borderline to low BP, HLD, and active daily smoker presented to ED c/o substernal chest pain started yesterday. Pain is 6/10, radiating to her left chest, pressure-like, and worsens with exertion and taking deep breath. She also reports 2 times of transient left arm and hand numbness which occurred 4 days ago and yesterday, lasted about 15 minutes, associated with brief dizziness, and mild weakness of the hand. She still feels weakness on her left arm and hand now. She denies having fever, chills, SOB, diarrhea, abdominal pain, palpitation, or any other symptoms. She underwent stress test and full cardiac work-up within last 6 month, as her PMD was concerned about her bradycardia and borderline BP. However, all work-ups came out to be negative. She does not remember the name of the cardiologist.      In ED, BP 99/52, HR 50, other VS WNL, EKG NSR at 72 BPM with TWI in V1-V3, V4 T wave flattening, normal cardiac enz x2, labs grossly not significant, CT head negative. CXR; minimal left lung slight atelectasis. Pt still c/o midchest pain and mild weakness of the left hand. Is admitted to the telemetry floor for 1. r/o ACS  2. r/o ischemic stroke. (30 Mar 2018 23:44)          MEDICATIONS    acetaminophen   Tablet. 650 milliGRAM(s) Oral every 6 hours PRN  aspirin  chewable 81 milliGRAM(s) Oral daily  atorvastatin 40 milliGRAM(s) Oral at bedtime  famotidine    Tablet 20 milliGRAM(s) Oral two times a day  naproxen 500 milliGRAM(s) Oral two times a day  nicotine -  14 mG/24Hr(s) Patch 1 patch Transdermal daily         Family history: No history of dementia, strokes, or seizures   FAMILY HISTORY:  Family history of emphysema (Father)    SOCIAL HISTORY -- No history of tobacco or alcohol use     Allergies    No Known Allergies    Intolerances            Vital Signs Last 24 Hrs  T(C): 36.7 (31 Mar 2018 14:11), Max: 36.9 (30 Mar 2018 15:53)  T(F): 98 (31 Mar 2018 14:11), Max: 98.5 (31 Mar 2018 05:48)  HR: 61 (31 Mar 2018 14:11) (50 - 61)  BP: 115/64 (31 Mar 2018 14:11) (92/48 - 127/61)  BP(mean): --  RR: 17 (31 Mar 2018 14:11) (16 - 18)  SpO2: 99% (31 Mar 2018 14:11) (97% - 100%)      REVIEW OF SYSTEMS:    Constitutional: No fever, chills, fatigue, weakness  Eyes: no eye pain, visual disturbances, or discharge  ENT:  No difficulty hearing, tinnitus, vertigo; No sinus or throat pain  Neck: No pain or stiffness  Respiratory: No cough, dyspnea, wheezing   Cardiovascular:+ chest pain, palpitations,   Gastrointestinal: No abdominal or epigastric pain. No nausea, vomiting  No diarrhea or constipation.   Genitourinary: No dysuria, frequency, hematuria or incontinence  Neurological: No headaches, , tremors  Musculoskeletal: No joint pain or swelling; No muscle, back or extremity pain  Skin: No itching, burning, rashes or lesions   Lymph Nodes: No enlarged glands  Endocrine: No heat or cold intolerance; No hair loss, No h/o diabetes or thyroid dysfunction  Allergy and Immunologic: No hives or eczema    On Neurological Examination:    Mental Status - Patient is alert, awake, oriented X3. Confused Dementia Lethargic .     Follows commands well and able to answer questions appropriately. Mood and affect  normal  Follow simple commands  Follow complex commands    Speech -   Fluent    No Aphasia                              Cranial Nerves - Pupils 3 mm equal and reactive to light,   extraocular eye movements intact.   2 to 12 were normal     Motor Exam -   Right upper5/5  Left upper5/5  Right lower5/5  Left lower 5/5    With stimuli positive movement of all 4 extremities    Muscle tone - is normal all over. No asymmetry is seen.      Sensory    Bilateral intact to light touch    Gait -  normal      cerebellar is normal     GENERAL Exam:     Nontoxic , No Acute Distress   	  HEENT:  normocephalic, atraumatic  		  LUNGS:	Clear bilaterally  No Wheeze  Decreased bilaterally  	  HEART:	 Normal S1S2   No murmur RRR        	  GI/ ABDOMEN:  Soft  Non tender    EXTREMITIES:   No Edema  No Clubbing  No Cyanosis No Edema    MUSCULOSKELETAL: Normal Range of Motion  	   SKIN:      Normal   No Ecchymosis               LABS:  CBC Full  -  ( 31 Mar 2018 07:25 )  WBC Count : 7.6 K/uL  Hemoglobin : 12.4 g/dL  Hematocrit : 40.1 %  Platelet Count - Automated : 239 K/uL  Mean Cell Volume : 89.7 fl  Mean Cell Hemoglobin : 27.8 pg  Mean Cell Hemoglobin Concentration : 31.0 gm/dL  Auto Neutrophil # : 4.4 K/uL  Auto Lymphocyte # : 2.4 K/uL  Auto Monocyte # : 0.5 K/uL  Auto Eosinophil # : 0.3 K/uL  Auto Basophil # : 0.1 K/uL  Auto Neutrophil % : 57.7 %  Auto Lymphocyte % : 31.8 %  Auto Monocyte % : 6.4 %  Auto Eosinophil % : 3.3 %  Auto Basophil % : 0.7 %      03-31    140  |  109<H>  |  6<L>  ----------------------------<  88  3.9   |  23  |  0.50    Ca    8.5      31 Mar 2018 07:25  Phos  3.4     03-31  Mg     2.1     03-31    TPro  6.9  /  Alb  3.2<L>  /  TBili  0.5  /  DBili  x   /  AST  14  /  ALT  16  /  AlkPhos  74  03-30    Hemoglobin A1C:   Lipid Panel 03-31 @ 07:25  Total Cholesterol, Serum 231    Triglycerides 306    LIVER FUNCTIONS - ( 30 Mar 2018 15:16 )  Alb: 3.2 g/dL / Pro: 6.9 g/dL / ALK PHOS: 74 U/L / ALT: 16 U/L DA / AST: 14 U/L / GGT: x           Vitamin B12 Vitamin B12, Serum: 211 pg/mL (03-31 @ 09:38)    PT/INR - ( 31 Mar 2018 07:25 )   PT: 11.9 sec;   INR: 1.09 ratio         PTT - ( 31 Mar 2018 07:25 )  PTT:32.0 sec      RADIOLOGYEXAM:  CT BRAIN                            PROCEDURE DATE:  03/30/2018          INTERPRETATION:  CLINICAL HISTORY:  Dizziness. Resolved lower extremity   numbness for 3 days ago.    TECHNIQUE:  CT of the head without contrast.  Contiguous transaxial images of the head were acquired from the skull   base to the vertex without the administration of iodinated contrast.   Coronal and sagittal reformatted images are provided.     COMPARISON:  None available.    FINDINGS:    There is no acute intracranial hemorrhage, mass effect from vasogenic   edema or evidence of acute territorial infarct.    The ventricles, sulci and cisternal spaces are unremarkable in size and   configuration.  There is no midline shift or abnormal extra-axial fluid   collection.    The calvarium is intact.  There are no osteoblastic or lytic calvarial or   skull base lesions.  The paranasal sinuses and mastoid air cells are   clear.    IMPRESSION:  No acute intracranial hemorrhage, mass effect or evidence of acute   territorial infarct.   Xray Chest 1 View AP/PA (03.30.18                       PROCEDURE DATE:  03/30/2018          INTERPRETATION:  AP semierect chest on March 30, 2018 at 2:20 PM. Patient   has chest pain and is short of breath.    Heart size is within normal limits.    The present study shows minimal linear densities at the left lower lung   field laterally suggesting slight atelectasis or scar. These are new   since October 28, 2011.    IMPRESSION: Minimal linear left lower lung findings are now seen.

## 2018-03-31 NOTE — DISCHARGE NOTE ADULT - PLAN OF CARE
continue with ibuprofen as above. Continue with ibuprofen as above for likely inflammation causing your chest pain. Please take famotidine with this medication as it can cause upper GI irritation and even bleeding. Please follow-up with your primary care doctor in 1-2 weeks. Your lipid panel was found to be elevated. You will likely need to repeat a lipid panel with 12 hours of fasting with your primary care doctor in 1-2 weeks. You were worked up for possible stroke. CTA of head and neck was performed, which did not show any abnormalities. Please follow-up with your primary care doctor as routine. Prescription has been provided for smoking cessation. Please follow-up with your primary care doctor. Continue with Naproxen for 7 days as above for likely inflammation causing your chest pain. Please take famotidine with this medication as it can cause upper GI irritation and even bleeding. Please follow-up with your primary care doctor in 1-2 weeks.

## 2018-04-01 VITALS
DIASTOLIC BLOOD PRESSURE: 70 MMHG | SYSTOLIC BLOOD PRESSURE: 127 MMHG | OXYGEN SATURATION: 99 % | RESPIRATION RATE: 16 BRPM | HEART RATE: 55 BPM | TEMPERATURE: 98 F

## 2018-04-01 LAB
ANION GAP SERPL CALC-SCNC: 8 MMOL/L — SIGNIFICANT CHANGE UP (ref 5–17)
BASOPHILS # BLD AUTO: 0.1 K/UL — SIGNIFICANT CHANGE UP (ref 0–0.2)
BASOPHILS NFR BLD AUTO: 0.9 % — SIGNIFICANT CHANGE UP (ref 0–2)
BUN SERPL-MCNC: 6 MG/DL — LOW (ref 7–18)
CALCIUM SERPL-MCNC: 8.8 MG/DL — SIGNIFICANT CHANGE UP (ref 8.4–10.5)
CHLORIDE SERPL-SCNC: 109 MMOL/L — HIGH (ref 96–108)
CO2 SERPL-SCNC: 22 MMOL/L — SIGNIFICANT CHANGE UP (ref 22–31)
CREAT SERPL-MCNC: 0.56 MG/DL — SIGNIFICANT CHANGE UP (ref 0.5–1.3)
EOSINOPHIL # BLD AUTO: 0.2 K/UL — SIGNIFICANT CHANGE UP (ref 0–0.5)
EOSINOPHIL NFR BLD AUTO: 3.3 % — SIGNIFICANT CHANGE UP (ref 0–6)
GLUCOSE SERPL-MCNC: 85 MG/DL — SIGNIFICANT CHANGE UP (ref 70–99)
HCT VFR BLD CALC: 40.4 % — SIGNIFICANT CHANGE UP (ref 34.5–45)
HGB BLD-MCNC: 12.4 G/DL — SIGNIFICANT CHANGE UP (ref 11.5–15.5)
LYMPHOCYTES # BLD AUTO: 2.1 K/UL — SIGNIFICANT CHANGE UP (ref 1–3.3)
LYMPHOCYTES # BLD AUTO: 36.1 % — SIGNIFICANT CHANGE UP (ref 13–44)
MAGNESIUM SERPL-MCNC: 2.1 MG/DL — SIGNIFICANT CHANGE UP (ref 1.6–2.6)
MCHC RBC-ENTMCNC: 27.5 PG — SIGNIFICANT CHANGE UP (ref 27–34)
MCHC RBC-ENTMCNC: 30.8 GM/DL — LOW (ref 32–36)
MCV RBC AUTO: 89.5 FL — SIGNIFICANT CHANGE UP (ref 80–100)
MONOCYTES # BLD AUTO: 0.4 K/UL — SIGNIFICANT CHANGE UP (ref 0–0.9)
MONOCYTES NFR BLD AUTO: 7.2 % — SIGNIFICANT CHANGE UP (ref 2–14)
NEUTROPHILS # BLD AUTO: 3 K/UL — SIGNIFICANT CHANGE UP (ref 1.8–7.4)
NEUTROPHILS NFR BLD AUTO: 52.4 % — SIGNIFICANT CHANGE UP (ref 43–77)
PHOSPHATE SERPL-MCNC: 3.7 MG/DL — SIGNIFICANT CHANGE UP (ref 2.5–4.5)
PLATELET # BLD AUTO: 220 K/UL — SIGNIFICANT CHANGE UP (ref 150–400)
POTASSIUM SERPL-MCNC: 4.1 MMOL/L — SIGNIFICANT CHANGE UP (ref 3.5–5.3)
POTASSIUM SERPL-SCNC: 4.1 MMOL/L — SIGNIFICANT CHANGE UP (ref 3.5–5.3)
RBC # BLD: 4.51 M/UL — SIGNIFICANT CHANGE UP (ref 3.8–5.2)
RBC # FLD: 15.7 % — HIGH (ref 10.3–14.5)
SODIUM SERPL-SCNC: 139 MMOL/L — SIGNIFICANT CHANGE UP (ref 135–145)
WBC # BLD: 5.8 K/UL — SIGNIFICANT CHANGE UP (ref 3.8–10.5)
WBC # FLD AUTO: 5.8 K/UL — SIGNIFICANT CHANGE UP (ref 3.8–10.5)

## 2018-04-01 RX ADMIN — FAMOTIDINE 20 MILLIGRAM(S): 10 INJECTION INTRAVENOUS at 06:16

## 2018-04-01 RX ADMIN — Medication 1 PATCH: at 01:00

## 2018-04-01 RX ADMIN — Medication 1 PATCH: at 11:33

## 2018-04-01 RX ADMIN — Medication 1 PATCH: at 11:30

## 2018-04-01 RX ADMIN — Medication 81 MILLIGRAM(S): at 11:30

## 2018-04-01 RX ADMIN — Medication 500 MILLIGRAM(S): at 06:16

## 2018-04-01 RX ADMIN — Medication 500 MILLIGRAM(S): at 06:59

## 2018-04-01 RX ADMIN — FAMOTIDINE 20 MILLIGRAM(S): 10 INJECTION INTRAVENOUS at 17:26

## 2018-04-01 RX ADMIN — Medication 500 MILLIGRAM(S): at 17:26

## 2018-04-01 RX ADMIN — Medication 500 MILLIGRAM(S): at 17:55

## 2018-04-01 NOTE — PROGRESS NOTE ADULT - SUBJECTIVE AND OBJECTIVE BOX
KELI DONAHUE  [id number]    Patient is a 47y old  Female who presents with a chief complaint of Chest pain, and left arm numbness (31 Mar 2018 11:05)      patient was seen and examined    s still has pain in left chest increased with inspiration    MEDICATIONS  (STANDING):  aspirin  chewable 81 milliGRAM(s) Oral daily  atorvastatin 40 milliGRAM(s) Oral at bedtime  famotidine    Tablet 20 milliGRAM(s) Oral two times a day  naproxen 500 milliGRAM(s) Oral two times a day  nicotine -  14 mG/24Hr(s) Patch 1 patch Transdermal daily      MEDICATIONS  (PRN):  acetaminophen   Tablet. 650 milliGRAM(s) Oral every 6 hours PRN Moderate Pain (4 - 6)      Allergies    No Known Allergies    Intolerances        PAST MEDICAL & SURGICAL HISTORY:  HLD (hyperlipidemia)  Smoker  Pre-diabetes  Uterine cyst  Closed fracture of right ankle, initial encounter      FAMILY HISTORY:  Family history of emphysema (Father)      SOCIAL HISTORY  Smoking History:     REVIEW OF SYSTEMS:    CONSTITUTIONAL:  Fevers [  ]  Yes  [ x ]  No                                   chills [  ]  Yes  [x  ]  No                               sweats  [  ]  Yes  [  x]  No                                fatigue [  ]  Yes  [ x ]  No    HEENT:  Eyes:  Diplopia or blurred vision [  ]  Yes  [x  ]  No    ENT:                        earache  [  ]  Yes  [  ]x  No                             sore throat  [  ]  Yes  [x]  No                            runny nose.  [  ]  Yes  [  x]  No    CARDIOVASCULAR:       chest pain  [ x ]  Yes  [  ]  No                        squeezing, tightness,  [  ]  Yes  [ x ]  No                                      palpitations.  [  ]  Yes  [ x ]  No    RESPIRATORY:             Cough [  ]  Yes  [ x ]  No                                  Wheezing [  ]  Yes  [ x ]  No                                Hemoptysis [  ]  Yes  [ x ]  No                                      Sputum [  ]  Yes  [x  ]  No                   Shortness of Breathe [  ]  Yes  [ x ]  No    GASTROINTESTINAL:      Abdominal pain  [  ]  Yes  [x  ]  No                                                    Nausea  [  ]  Yes  [x  ]  No                                                   Vomiting [  ]  Yes  [x  ]  No                                              Constipation [  ]  Yes  [ x ]  No                                                    Diarrhea [  ]  Yes  [xx  ]  No    GENITOURINARY:           Incontinence [  ]  Yes  [ x ]  No                                                Dysuria [  ]  Yes  [x  ]  No                                      Blood in Urine  [  ]  Yes  [ x ]  No                          Frequency or urgency.  [  ]  Yes  [  x]  No    NEUROLOGIC:                     Paresthesias  [  ]  Yes  [x  ]  No                                             fasciculations  [  ]  Yes  [x  ]  No                                seizures or weakness.  [  ]  Yes  [ x ]  No                                                   Headache [  ]  Yes  [ xx ]  No                                  Loss of Conciousness [  ]  Yes  [ x ]  No                                                     Insomnia [  ]  Yes  [x  ]  No    PSYCHIATRIC:    Disorder of thought or mood.  [  ]  Yes  x[  ]  No                                                           Anxiety [  ]  Yes  [  x]  No                                                      Depression [  ]  Yes  [ x ]  No                                                         Dementia [  ]  Yes  [x  ]  No    Vital Signs Last 24 Hrs  T(C): 36.7 (31 Mar 2018 14:11), Max: 36.9 (31 Mar 2018 05:48)  T(F): 98 (31 Mar 2018 14:11), Max: 98.5 (31 Mar 2018 05:48)  HR: 61 (31 Mar 2018 14:11) (50 - 61)  BP: 115/64 (31 Mar 2018 14:11) (92/48 - 127/61)  BP(mean): --  RR: 17 (31 Mar 2018 14:11) (16 - 18)  SpO2: 99% (31 Mar 2018 14:11) (97% - 100%)  I&O's Detail      PHYSICAL EXAMINATION:    GENERAL: The patient is a well-developed, well-nourished _____in no apparent distress.     HEENT: Head is normocephalic and atraumatic. Extraocular muscles are intact. Mucous membranes are moist.     NECK: Supple. jvd [  ]  Yes  [ x ]  No    LUNGS: Clear to auscultation  [ x ]  Yes  [  ]  No                               wheezing, [  ]  Yes  [x  ]  No                                      rales  [  ]  Yes  [  x]  No                                    rhonchi  [  ]  Yes  [ x ]  No                 Respirations labored  [  ]  Yes  [x  ]  No  left chest wall tender near costochodral border    HEART: Regular rate and rhythm  [  x]  Yes  [  ]  No                                        Murmur [  ]  Yes  [x  ]  No                                              rub  [  ]  Yes  [x  ]  No                                          gallop  [  ]  Yes  [x  ]  No    ABDOMEN:                                 Soft, [x  ]  Yes  [  ]  No                                             nontender [ x ]  Yes  [  ]  No                                             distended.[  ]  Yes  [  x]  No                            hepatosplenomegaly ,[  ]  Yes  [  x]  No                                                    BS   [x  ]  Yes  [  ]  No    EXTREMITIES:                cyanosis, [  ]  Yes  [x  ]  No                                       clubbing,   [  ]  Yes  [x  ]  No                                               rash, [  ]  Yes  [ x ]  No                                           edema.  [  ]  Yes  x[  ]  No    NEUROLOGIC: Alert and Oriented  [  x] Person [ x] Time  [ x] Place                                    Cranial nerves intact    [x  ]  Yes  [  ]  No                                               sensory Intact  [  x]  Yes  [  ]  No                                                  motor WNL   [x  ]  Yes  [  ]  No                                                Kevin Paresis  [  x]  Yes  [  ]  No  DTR  babinski+ or -      LABS:                        12.4   7.6   )-----------( 239      ( 31 Mar 2018 07:25 )             40.1     03-31    140  |  109<H>  |  6<L>  ----------------------------<  88  3.9   |  23  |  0.50    Ca    8.5      31 Mar 2018 07:25  Phos  3.4     03-31  Mg     2.1     03-31    TPro  6.9  /  Alb  3.2<L>  /  TBili  0.5  /  DBili  x   /  AST  14  /  ALT  16  /  AlkPhos  74  03-30    PT/INR - ( 31 Mar 2018 07:25 )   PT: 11.9 sec;   INR: 1.09 ratio         PTT - ( 31 Mar 2018 07:25 )  PTT:32.0 sec      CARDIAC MARKERS ( 31 Mar 2018 07:25 )  <0.015 ng/mL / x     / 66 U/L / x     / <1.0 ng/mL  CARDIAC MARKERS ( 30 Mar 2018 19:45 )  <0.015 ng/mL / x     / x     / x     / x      CARDIAC MARKERS ( 30 Mar 2018 15:16 )  <0.015 ng/mL / x     / x     / x     / x                    MICROBIOLOGY:    RADIOLOGY & ADDITIONAL STUDIES:< from: CT Head No Cont (03.30.18 @ 23:24) >  IMPRESSION:  No acute intracranial hemorrhage, mass effect or evidence of acute   territorial infarct.        < end of copied text >      CXR:< from: Xray Chest 1 View AP/PA (03.30.18 @ 14:36) >  IMPRESSION: Minimal linear left lower lung findings are now seen.        < end of copied text >      Ct scan chest:    ekg;    echo:
NO CAROTID OR VERTEBROBASILLAR DISSECTION    NO ANEURYSM    CHEST PAIN      RULE OUT CERVICAL MYELOPATHY     PLAN FOLLOW IN OFFICE AND WE WILL SCHEDULE MRI OF THE CERVICAL SPINE AFTER REVIEWING THE REPORTS FROM [facility] OR FROM [facility] FOR THE ANKLE PLATE.    NEUROLOGICALLY  STABLE FOR DISCHARGE
KELI DONAHUE  MRN-[id number]    Patient is a 47y old  Female who presents with a chief complaint of Chest pain, and left arm numbness (31 Mar 2018 11:05)      patient was seen and examined    s doing better ,     .MEDICATIONS  (STANDING):  aspirin  chewable 81 milliGRAM(s) Oral daily  atorvastatin 40 milliGRAM(s) Oral at bedtime  famotidine    Tablet 20 milliGRAM(s) Oral two times a day  naproxen 500 milliGRAM(s) Oral two times a day  nicotine -  14 mG/24Hr(s) Patch 1 patch Transdermal daily    MEDICATIONS  (PRN):  acetaminophen   Tablet. 650 milliGRAM(s) Oral every 6 hours PRN Moderate Pain (4 - 6)    Allergies    No Known Allergies    Intolerances        PAST MEDICAL & SURGICAL HISTORY:  HLD (hyperlipidemia)  Smoker  Pre-diabetes  Uterine cyst  Closed fracture of right ankle, initial encounter      FAMILY HISTORY:  Family history of emphysema (Father)      SOCIAL HISTORY  Smoking History:     REVIEW OF SYSTEMS:    CONSTITUTIONAL:  Fevers [  ]  Yes  [ x ]  No                                   chills [  ]  Yes  [x  ]  No                               sweats  [  ]  Yes  [  x]  No                                fatigue [  ]  Yes  [ x ]  No    HEENT:  Eyes:  Diplopia or blurred vision [  ]  Yes  [x  ]  No    ENT:                        earache  [  ]  Yes  [  ]x  No                             sore throat  [  ]  Yes  [x]  No                            runny nose.  [  ]  Yes  [  x]  No    CARDIOVASCULAR:       chest pain  [ x ]  Yes  [  ]  No                        squeezing, tightness,  [  ]  Yes  [ x ]  No                                      palpitations.  [  ]  Yes  [ x ]  No    RESPIRATORY:             Cough [  ]  Yes  [ x ]  No                                  Wheezing [  ]  Yes  [ x ]  No                                Hemoptysis [  ]  Yes  [ x ]  No                                      Sputum [  ]  Yes  [x  ]  No                   Shortness of Breathe [  ]  Yes  [ x ]  No    GASTROINTESTINAL:      Abdominal pain  [  ]  Yes  [x  ]  No                                                    Nausea  [  ]  Yes  [x  ]  No                                                   Vomiting [  ]  Yes  [x  ]  No                                              Constipation [  ]  Yes  [ x ]  No                                                    Diarrhea [  ]  Yes  [xx  ]  No    GENITOURINARY:           Incontinence [  ]  Yes  [ x ]  No                                                Dysuria [  ]  Yes  [x  ]  No                                      Blood in Urine  [  ]  Yes  [ x ]  No                          Frequency or urgency.  [  ]  Yes  [  x]  No    NEUROLOGIC:                     Paresthesias  [  ]  Yes  [x  ]  No                                             fasciculations  [  ]  Yes  [x  ]  No                                seizures or weakness.  [  ]  Yes  [ x ]  No                                                   Headache [  ]  Yes  [ xx ]  No                                  Loss of Conciousness [  ]  Yes  [ x ]  No                                                     Insomnia [  ]  Yes  [x  ]  No    PSYCHIATRIC:    Disorder of thought or mood.  [  ]  Yes  x[  ]  No                                                           Anxiety [  ]  Yes  [  x]  No                                                      Depression [  ]  Yes  [ x ]  No                                                         Dementia [  ]  Yes  [x  ]  No    .Vital Signs Last 24 Hrs  T(C): 36.8 (01 Apr 2018 13:00), Max: 37 (01 Apr 2018 04:55)  T(F): 98.2 (01 Apr 2018 13:00), Max: 98.6 (01 Apr 2018 04:55)  HR: 55 (01 Apr 2018 13:00) (50 - 55)  BP: 127/70 (01 Apr 2018 13:00) (104/58 - 127/70)  BP(mean): --  RR: 16 (01 Apr 2018 13:00) (16 - 17)  SpO2: 99% (01 Apr 2018 13:00) (98% - 100%)    PHYSICAL EXAMINATION:    GENERAL: The patient is a well-developed, well-nourished _____in no apparent distress.     HEENT: Head is normocephalic and atraumatic. Extraocular muscles are intact. Mucous membranes are moist.     NECK: Supple. jvd [  ]  Yes  [ x ]  No    LUNGS: Clear to auscultation  [ x ]  Yes  [  ]  No                               wheezing, [  ]  Yes  [x  ]  No                                      rales  [  ]  Yes  [  x]  No                                    rhonchi  [  ]  Yes  [ x ]  No                 Respirations labored  [  ]  Yes  [x  ]  No  left chest wall tenderness better    HEART: Regular rate and rhythm  [  x]  Yes  [  ]  No                                        Murmur [  ]  Yes  [x  ]  No                                              rub  [  ]  Yes  [x  ]  No                                          gallop  [  ]  Yes  [x  ]  No    ABDOMEN:                                 Soft, [x  ]  Yes  [  ]  No                                             nontender [ x ]  Yes  [  ]  No                                             distended.[  ]  Yes  [  x]  No                            hepatosplenomegaly ,[  ]  Yes  [  x]  No                                                    BS   [x  ]  Yes  [  ]  No    EXTREMITIES:                cyanosis, [  ]  Yes  [x  ]  No                                       clubbing,   [  ]  Yes  [x  ]  No                                               rash, [  ]  Yes  [ x ]  No                                           edema.  [  ]  Yes  x[  ]  No    NEUROLOGIC: Alert and Oriented  [  x] Person [ x] Time  [ x] Place                                    Cranial nerves intact    [x  ]  Yes  [  ]  No                                               sensory Intact  [  x]  Yes  [  ]  No                                                  motor WNL   [x  ]  Yes  [  ]  No                                                Kevin Paresis  [  x]  Yes  [  ]  No  DTR  babinski+ or -    LABS:                        12.4   5.8   )-----------( 220      ( 01 Apr 2018 08:11 )             40.4     04-01    139  |  109<H>  |  6<L>  ----------------------------<  85  4.1   |  22  |  0.56    Ca    8.8      01 Apr 2018 08:11  Phos  3.7     04-01  Mg     2.1     04-01      PT/INR - ( 31 Mar 2018 07:25 )   PT: 11.9 sec;   INR: 1.09 ratio         PTT - ( 31 Mar 2018 07:25 )  PTT:32.0 sec      CARDIAC MARKERS ( 31 Mar 2018 07:25 )  <0.015 ng/mL / x     / 66 U/L / x     / <1.0 ng/mL  CARDIAC MARKERS ( 30 Mar 2018 19:45 )  <0.015 ng/mL / x     / x     / x     / x          < from: CT Angio Head w/ IV Cont (03.31.18 @ 20:26) >    IMPRESSION:  Patent cervical and intracranial vessels without evidence of abrupt   vessel cut off, hemodynamically significant stenosis, aneurysm, or   dissection.    Noncontrast CT head demonstrates no acute intracranial hemorrhage.        < end of copied text >              LABS:                        12.4   7.6   )-----------( 239      ( 31 Mar 2018 07:25 )             40.1     03-31    140  |  109<H>  |  6<L>  ----------------------------<  88  3.9   |  23  |  0.50    Ca    8.5      31 Mar 2018 07:25  Phos  3.4     03-31  Mg     2.1     03-31    TPro  6.9  /  Alb  3.2<L>  /  TBili  0.5  /  DBili  x   /  AST  14  /  ALT  16  /  AlkPhos  74  03-30    PT/INR - ( 31 Mar 2018 07:25 )   PT: 11.9 sec;   INR: 1.09 ratio         PTT - ( 31 Mar 2018 07:25 )  PTT:32.0 sec      CARDIAC MARKERS ( 31 Mar 2018 07:25 )  <0.015 ng/mL / x     / 66 U/L / x     / <1.0 ng/mL  CARDIAC MARKERS ( 30 Mar 2018 19:45 )  <0.015 ng/mL / x     / x     / x     / x      CARDIAC MARKERS ( 30 Mar 2018 15:16 )  <0.015 ng/mL / x     / x     / x     / x                    MICROBIOLOGY:    RADIOLOGY & ADDITIONAL STUDIES:< from: CT Head No Cont (03.30.18 @ 23:24) >  IMPRESSION:  No acute intracranial hemorrhage, mass effect or evidence of acute   territorial infarct.        < end of copied text >      CXR:< from: Xray Chest 1 View AP/PA (03.30.18 @ 14:36) >  IMPRESSION: Minimal linear left lower lung findings are now seen.        < end of copied text >      Ct scan chest:    ekg;    echo:

## 2018-04-01 NOTE — PROGRESS NOTE ADULT - PROBLEM SELECTOR PLAN 3
resolved   neuro w/p in progress   patient cant have mri due to metal in body
resolved   neuro w/p in progress   patient cant have mri due to metal in body

## 2018-04-03 RX ORDER — FAMOTIDINE 10 MG/ML
1 INJECTION INTRAVENOUS
Qty: 60 | Refills: 0 | OUTPATIENT
Start: 2018-04-03

## 2018-04-03 RX ORDER — NICOTINE POLACRILEX 2 MG
1 GUM BUCCAL
Qty: 7 | Refills: 0 | OUTPATIENT
Start: 2018-04-03

## 2018-05-23 PROCEDURE — 93005 ELECTROCARDIOGRAM TRACING: CPT

## 2018-05-23 PROCEDURE — 85730 THROMBOPLASTIN TIME PARTIAL: CPT

## 2018-05-23 PROCEDURE — 82553 CREATINE MB FRACTION: CPT

## 2018-05-23 PROCEDURE — 87389 HIV-1 AG W/HIV-1&-2 AB AG IA: CPT

## 2018-05-23 PROCEDURE — 80053 COMPREHEN METABOLIC PANEL: CPT

## 2018-05-23 PROCEDURE — 82550 ASSAY OF CK (CPK): CPT

## 2018-05-23 PROCEDURE — 84100 ASSAY OF PHOSPHORUS: CPT

## 2018-05-23 PROCEDURE — 99285 EMERGENCY DEPT VISIT HI MDM: CPT | Mod: 25

## 2018-05-23 PROCEDURE — 82962 GLUCOSE BLOOD TEST: CPT

## 2018-05-23 PROCEDURE — 83735 ASSAY OF MAGNESIUM: CPT

## 2018-05-23 PROCEDURE — 82746 ASSAY OF FOLIC ACID SERUM: CPT

## 2018-05-23 PROCEDURE — 84443 ASSAY THYROID STIM HORMONE: CPT

## 2018-05-23 PROCEDURE — 93306 TTE W/DOPPLER COMPLETE: CPT

## 2018-05-23 PROCEDURE — 70450 CT HEAD/BRAIN W/O DYE: CPT

## 2018-05-23 PROCEDURE — 82607 VITAMIN B-12: CPT

## 2018-05-23 PROCEDURE — 71045 X-RAY EXAM CHEST 1 VIEW: CPT

## 2018-05-23 PROCEDURE — 81025 URINE PREGNANCY TEST: CPT

## 2018-05-23 PROCEDURE — 80048 BASIC METABOLIC PNL TOTAL CA: CPT

## 2018-05-23 PROCEDURE — 85027 COMPLETE CBC AUTOMATED: CPT

## 2018-05-23 PROCEDURE — 70496 CT ANGIOGRAPHY HEAD: CPT

## 2018-05-23 PROCEDURE — 84484 ASSAY OF TROPONIN QUANT: CPT

## 2018-05-23 PROCEDURE — 70498 CT ANGIOGRAPHY NECK: CPT

## 2018-05-23 PROCEDURE — 83036 HEMOGLOBIN GLYCOSYLATED A1C: CPT

## 2018-05-23 PROCEDURE — 80061 LIPID PANEL: CPT

## 2018-05-23 PROCEDURE — 85610 PROTHROMBIN TIME: CPT

## 2018-08-22 ENCOUNTER — EMERGENCY (EMERGENCY)
Facility: HOSPITAL | Age: 48
LOS: 1 days | Discharge: HOME | End: 2018-08-22
Attending: EMERGENCY MEDICINE
Payer: MEDICAID

## 2018-08-22 VITALS
WEIGHT: 149.91 LBS | TEMPERATURE: 99 F | SYSTOLIC BLOOD PRESSURE: 148 MMHG | OXYGEN SATURATION: 98 % | HEIGHT: 67 IN | HEART RATE: 76 BPM | DIASTOLIC BLOOD PRESSURE: 91 MMHG | RESPIRATION RATE: 18 BRPM

## 2018-08-22 VITALS
HEART RATE: 52 BPM | TEMPERATURE: 98 F | DIASTOLIC BLOOD PRESSURE: 56 MMHG | RESPIRATION RATE: 20 BRPM | SYSTOLIC BLOOD PRESSURE: 110 MMHG | OXYGEN SATURATION: 97 %

## 2018-08-22 DIAGNOSIS — N85.8 OTHER SPECIFIED NONINFLAMMATORY DISORDERS OF UTERUS: Chronic | ICD-10-CM

## 2018-08-22 DIAGNOSIS — S82.891A OTHER FRACTURE OF RIGHT LOWER LEG, INITIAL ENCOUNTER FOR CLOSED FRACTURE: Chronic | ICD-10-CM

## 2018-08-22 PROBLEM — R73.03 PREDIABETES: Chronic | Status: ACTIVE | Noted: 2018-03-31

## 2018-08-22 PROBLEM — E78.5 HYPERLIPIDEMIA, UNSPECIFIED: Chronic | Status: ACTIVE | Noted: 2018-03-31

## 2018-08-22 PROBLEM — F17.200 NICOTINE DEPENDENCE, UNSPECIFIED, UNCOMPLICATED: Chronic | Status: ACTIVE | Noted: 2018-03-31

## 2018-08-22 LAB
ALBUMIN SERPL ELPH-MCNC: 3.1 G/DL — LOW (ref 3.5–5)
ALP SERPL-CCNC: 85 U/L — SIGNIFICANT CHANGE UP (ref 40–120)
ALT FLD-CCNC: 21 U/L DA — SIGNIFICANT CHANGE UP (ref 10–60)
ANION GAP SERPL CALC-SCNC: 7 MMOL/L — SIGNIFICANT CHANGE UP (ref 5–17)
APPEARANCE UR: CLEAR — SIGNIFICANT CHANGE UP
AST SERPL-CCNC: 18 U/L — SIGNIFICANT CHANGE UP (ref 10–40)
BASOPHILS # BLD AUTO: 0.1 K/UL — SIGNIFICANT CHANGE UP (ref 0–0.2)
BASOPHILS NFR BLD AUTO: 0.8 % — SIGNIFICANT CHANGE UP (ref 0–2)
BILIRUB SERPL-MCNC: 0.5 MG/DL — SIGNIFICANT CHANGE UP (ref 0.2–1.2)
BILIRUB UR-MCNC: ABNORMAL
BUN SERPL-MCNC: 11 MG/DL — SIGNIFICANT CHANGE UP (ref 7–18)
CALCIUM SERPL-MCNC: 8.6 MG/DL — SIGNIFICANT CHANGE UP (ref 8.4–10.5)
CHLORIDE SERPL-SCNC: 107 MMOL/L — SIGNIFICANT CHANGE UP (ref 96–108)
CO2 SERPL-SCNC: 25 MMOL/L — SIGNIFICANT CHANGE UP (ref 22–31)
COLOR SPEC: ABNORMAL
CREAT SERPL-MCNC: 0.63 MG/DL — SIGNIFICANT CHANGE UP (ref 0.5–1.3)
DIFF PNL FLD: ABNORMAL
EOSINOPHIL # BLD AUTO: 0.2 K/UL — SIGNIFICANT CHANGE UP (ref 0–0.5)
EOSINOPHIL NFR BLD AUTO: 2.2 % — SIGNIFICANT CHANGE UP (ref 0–6)
GLUCOSE SERPL-MCNC: 119 MG/DL — HIGH (ref 70–99)
GLUCOSE UR QL: NEGATIVE — SIGNIFICANT CHANGE UP
HCG SERPL-ACNC: <1 MIU/ML — SIGNIFICANT CHANGE UP
HCG UR QL: NEGATIVE — SIGNIFICANT CHANGE UP
HCT VFR BLD CALC: 42.3 % — SIGNIFICANT CHANGE UP (ref 34.5–45)
HGB BLD-MCNC: 14.2 G/DL — SIGNIFICANT CHANGE UP (ref 11.5–15.5)
KETONES UR-MCNC: NEGATIVE — SIGNIFICANT CHANGE UP
LEUKOCYTE ESTERASE UR-ACNC: NEGATIVE — SIGNIFICANT CHANGE UP
LIDOCAIN IGE QN: 172 U/L — SIGNIFICANT CHANGE UP (ref 73–393)
LYMPHOCYTES # BLD AUTO: 2.7 K/UL — SIGNIFICANT CHANGE UP (ref 1–3.3)
LYMPHOCYTES # BLD AUTO: 25.5 % — SIGNIFICANT CHANGE UP (ref 13–44)
MCHC RBC-ENTMCNC: 30.6 PG — SIGNIFICANT CHANGE UP (ref 27–34)
MCHC RBC-ENTMCNC: 33.6 GM/DL — SIGNIFICANT CHANGE UP (ref 32–36)
MCV RBC AUTO: 91.2 FL — SIGNIFICANT CHANGE UP (ref 80–100)
MONOCYTES # BLD AUTO: 0.7 K/UL — SIGNIFICANT CHANGE UP (ref 0–0.9)
MONOCYTES NFR BLD AUTO: 6.5 % — SIGNIFICANT CHANGE UP (ref 2–14)
NEUTROPHILS # BLD AUTO: 6.9 K/UL — SIGNIFICANT CHANGE UP (ref 1.8–7.4)
NEUTROPHILS NFR BLD AUTO: 65.1 % — SIGNIFICANT CHANGE UP (ref 43–77)
NITRITE UR-MCNC: POSITIVE
PH UR: 6.5 — SIGNIFICANT CHANGE UP (ref 5–8)
PLATELET # BLD AUTO: 265 K/UL — SIGNIFICANT CHANGE UP (ref 150–400)
POTASSIUM SERPL-MCNC: 3.8 MMOL/L — SIGNIFICANT CHANGE UP (ref 3.5–5.3)
POTASSIUM SERPL-SCNC: 3.8 MMOL/L — SIGNIFICANT CHANGE UP (ref 3.5–5.3)
PROT SERPL-MCNC: 6.7 G/DL — SIGNIFICANT CHANGE UP (ref 6–8.3)
PROT UR-MCNC: 15
RBC # BLD: 4.64 M/UL — SIGNIFICANT CHANGE UP (ref 3.8–5.2)
RBC # FLD: 15.6 % — HIGH (ref 10.3–14.5)
SODIUM SERPL-SCNC: 139 MMOL/L — SIGNIFICANT CHANGE UP (ref 135–145)
SP GR SPEC: 1.01 — SIGNIFICANT CHANGE UP (ref 1.01–1.02)
UROBILINOGEN FLD QL: 8
WBC # BLD: 10.6 K/UL — HIGH (ref 3.8–10.5)
WBC # FLD AUTO: 10.6 K/UL — HIGH (ref 3.8–10.5)

## 2018-08-22 PROCEDURE — 99284 EMERGENCY DEPT VISIT MOD MDM: CPT | Mod: 25

## 2018-08-22 PROCEDURE — 74177 CT ABD & PELVIS W/CONTRAST: CPT

## 2018-08-22 PROCEDURE — 87086 URINE CULTURE/COLONY COUNT: CPT

## 2018-08-22 PROCEDURE — 84702 CHORIONIC GONADOTROPIN TEST: CPT

## 2018-08-22 PROCEDURE — 85027 COMPLETE CBC AUTOMATED: CPT

## 2018-08-22 PROCEDURE — 81001 URINALYSIS AUTO W/SCOPE: CPT

## 2018-08-22 PROCEDURE — 80053 COMPREHEN METABOLIC PANEL: CPT

## 2018-08-22 PROCEDURE — 74177 CT ABD & PELVIS W/CONTRAST: CPT | Mod: 26

## 2018-08-22 PROCEDURE — 83690 ASSAY OF LIPASE: CPT

## 2018-08-22 PROCEDURE — 81025 URINE PREGNANCY TEST: CPT

## 2018-08-22 RX ORDER — ONDANSETRON 8 MG/1
4 TABLET, FILM COATED ORAL ONCE
Qty: 0 | Refills: 0 | Status: COMPLETED | OUTPATIENT
Start: 2018-08-22 | End: 2018-08-22

## 2018-08-22 RX ORDER — NITROFURANTOIN MACROCRYSTAL 50 MG
1 CAPSULE ORAL
Qty: 14 | Refills: 0 | OUTPATIENT
Start: 2018-08-22 | End: 2018-08-28

## 2018-08-22 RX ORDER — PHENAZOPYRIDINE HCL 100 MG
200 TABLET ORAL ONCE
Qty: 0 | Refills: 0 | Status: COMPLETED | OUTPATIENT
Start: 2018-08-22 | End: 2018-08-22

## 2018-08-22 RX ORDER — PHENAZOPYRIDINE HCL 100 MG
1 TABLET ORAL
Qty: 6 | Refills: 0 | OUTPATIENT
Start: 2018-08-22

## 2018-08-22 RX ORDER — SODIUM CHLORIDE 9 MG/ML
3 INJECTION INTRAMUSCULAR; INTRAVENOUS; SUBCUTANEOUS ONCE
Qty: 0 | Refills: 0 | Status: COMPLETED | OUTPATIENT
Start: 2018-08-22 | End: 2018-08-22

## 2018-08-22 RX ADMIN — SODIUM CHLORIDE 3 MILLILITER(S): 9 INJECTION INTRAMUSCULAR; INTRAVENOUS; SUBCUTANEOUS at 09:23

## 2018-08-22 RX ADMIN — Medication 200 MILLIGRAM(S): at 09:23

## 2018-08-22 RX ADMIN — ONDANSETRON 4 MILLIGRAM(S): 8 TABLET, FILM COATED ORAL at 09:23

## 2018-08-22 NOTE — ED PROVIDER NOTE - OBJECTIVE STATEMENT
46 y/o F with a PMHx of HLD, Pre-diabetes and a PSHx of closed fracture of right ankle and uterine cyst presents to ED c/o of gradual onset of lower abd pain x last night (8pm). Pt also reports nausea, increased urinary urgency and frequency with sensations of an incomplete bladder after she pees. Pt denies fever, chills, chest pain, SOB, dysuria, vomiting, diarrhea or any other complaint. SHx: current smoker. NKDA.

## 2018-08-22 NOTE — ED PROVIDER NOTE - CHIEF COMPLAINT
The patient is a 47y Female complaining of The patient is a 47y Female complaining of abdominal pain

## 2018-08-22 NOTE — ED PROVIDER NOTE - MEDICAL DECISION MAKING DETAILS
48 y/o F presents to ED c/o of lower abd pain and urinary symptoms. Will check labs, no UTI will proceed with CT scan.

## 2018-08-22 NOTE — ED ADULT NURSE NOTE - OBJECTIVE STATEMENT
pt a&ox3, here with c/o abdominal pain. pt states abdominal pain since last night with nausea. denies fever, chills, vomiting, diarrhea. states 'I think its something I ate yesterday."

## 2018-08-22 NOTE — ED PROVIDER NOTE - PROGRESS NOTE DETAILS
Patient has not given urine. Will give IVF. I am now doubting UTI as source of patient's pain. Will start PO contrast for CT scan. Patient resting comfortably, feels markedly improved. Patient resting comfortably, feels markedly improved. Now asymptomatic. Only positive finding to explain acute pain is UTI. Patient given copy of CT to give to PMD to f/u on other findings (adrenal mass and gallbladder).

## 2018-08-22 NOTE — ED PROVIDER NOTE - CHPI ED SYMPTOMS POS
NAUSEA/PAIN/abd pain, increased urinary urgency and frequency, sensations of an "incomplete" bladder after urinating

## 2018-08-23 LAB
CULTURE RESULTS: SIGNIFICANT CHANGE UP
SPECIMEN SOURCE: SIGNIFICANT CHANGE UP

## 2018-12-03 NOTE — ED PROVIDER NOTE - MEDICAL DECISION MAKING DETAILS
48 y/o F pt w/ chest pain, mild shortness of breath and dizziness. Will check EKG, chest X-ray, labs and CT head.
no

## 2019-10-31 NOTE — ED ADULT NURSE NOTE - CHPI ED SYMPTOMS POS
You can access the FollowMyHealth Patient Portal offered by [facility] by registering at the following website: [email or web address]. By joining [facility]’s FollowMyHealth portal, you will also be able to view your health information using other applications (apps) compatible with our system.
CHEST PAIN/SHORTNESS OF BREATH
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
